# Patient Record
Sex: FEMALE | Race: WHITE | NOT HISPANIC OR LATINO
[De-identification: names, ages, dates, MRNs, and addresses within clinical notes are randomized per-mention and may not be internally consistent; named-entity substitution may affect disease eponyms.]

---

## 2017-05-11 ENCOUNTER — HOSPITAL (OUTPATIENT)
Dept: OTHER | Age: 27
End: 2017-05-11

## 2017-08-05 ENCOUNTER — HOSPITAL (OUTPATIENT)
Dept: OTHER | Age: 27
End: 2017-08-05
Attending: PODIATRIST

## 2018-03-28 ENCOUNTER — HOSPITAL (OUTPATIENT)
Dept: OTHER | Age: 28
End: 2018-03-28
Attending: EMERGENCY MEDICINE

## 2019-01-25 ENCOUNTER — OFFICE VISIT (OUTPATIENT)
Dept: NEUROSURGERY | Age: 29
End: 2019-01-25

## 2019-01-25 VITALS
DIASTOLIC BLOOD PRESSURE: 77 MMHG | BODY MASS INDEX: 20.31 KG/M2 | WEIGHT: 137.13 LBS | SYSTOLIC BLOOD PRESSURE: 121 MMHG | HEIGHT: 69 IN | HEART RATE: 88 BPM

## 2019-01-25 DIAGNOSIS — M54.2 NECK PAIN: Primary | ICD-10-CM

## 2019-01-25 DIAGNOSIS — M50.30 DISC DISEASE, DEGENERATIVE, CERVICAL: ICD-10-CM

## 2019-01-25 PROCEDURE — 99203 OFFICE O/P NEW LOW 30 MIN: CPT | Performed by: NEUROLOGICAL SURGERY

## 2019-01-25 RX ORDER — DULOXETIN HYDROCHLORIDE 30 MG/1
60 CAPSULE, DELAYED RELEASE ORAL
COMMUNITY
Start: 2018-07-19

## 2019-01-25 RX ORDER — IBUPROFEN 200 MG
200 TABLET ORAL
COMMUNITY

## 2019-01-25 RX ORDER — ALPRAZOLAM 0.5 MG/1
0.5 TABLET ORAL
COMMUNITY
Start: 2018-06-08

## 2019-01-25 ASSESSMENT — ENCOUNTER SYMPTOMS
WEAKNESS: 0
SPEECH DIFFICULTY: 0
HEADACHES: 0
HEMATOLOGIC/LYMPHATIC NEGATIVE: 1
ACTIVITY CHANGE: 0
NUMBNESS: 1
ENDOCRINE NEGATIVE: 1
EYES NEGATIVE: 1
BACK PAIN: 1
FATIGUE: 0
RESPIRATORY NEGATIVE: 1
LIGHT-HEADEDNESS: 0
APPETITE CHANGE: 0
SEIZURES: 0
UNEXPECTED WEIGHT CHANGE: 0
DIZZINESS: 0
TREMORS: 0
GASTROINTESTINAL NEGATIVE: 1

## 2020-03-10 ENCOUNTER — HOSPITAL (OUTPATIENT)
Dept: OTHER | Age: 30
End: 2020-03-10
Attending: PHYSICIAN ASSISTANT

## 2020-03-10 LAB
CONTROL LINE: PRESENT
Lab: CLEAR
URINE PREG POC: NEGATIVE

## 2022-11-07 ENCOUNTER — APPOINTMENT (OUTPATIENT)
Dept: OTHER | Facility: HOSPITAL | Age: 32
End: 2022-11-07
Attending: PREVENTIVE MEDICINE

## 2023-02-06 PROBLEM — Q75.2 HYPERTELORISM: Status: ACTIVE | Noted: 2023-02-06

## 2023-02-06 PROBLEM — K57.92 DIVERTICULITIS: Status: ACTIVE | Noted: 2023-02-06

## 2023-02-06 PROBLEM — Q67.6 PECTUS EXCAVATUM: Status: ACTIVE | Noted: 2023-02-06

## 2023-02-06 PROBLEM — N13.70 VESICO-URETERAL REFLUX: Status: ACTIVE | Noted: 2023-02-06

## 2025-03-04 ENCOUNTER — TELEPHONE (OUTPATIENT)
Facility: CLINIC | Age: 35
End: 2025-03-04

## 2025-03-04 NOTE — TELEPHONE ENCOUNTER
Patient scheduled for upper back pain.    Future Appointments   Date Time Provider Department Center   3/10/2025  8:30 AM Elvin Hedrick, PA [facility] ORTH [facility]     No recent imaging.    Please advise.

## 2025-03-10 ENCOUNTER — HOSPITAL ENCOUNTER (OUTPATIENT)
Dept: GENERAL RADIOLOGY | Facility: HOSPITAL | Age: 35
Discharge: HOME | End: 2025-03-10
Attending: PHYSICIAN ASSISTANT
Payer: COMMERCIAL

## 2025-03-10 ENCOUNTER — OFFICE VISIT (OUTPATIENT)
Age: 35
End: 2025-03-10
Payer: COMMERCIAL

## 2025-03-10 DIAGNOSIS — M48.062 SPINAL STENOSIS, LUMBAR REGION WITH NEUROGENIC CLAUDICATION: ICD-10-CM

## 2025-03-10 DIAGNOSIS — M54.2 NECK PAIN: ICD-10-CM

## 2025-03-10 DIAGNOSIS — M47.812 CERVICAL SPONDYLOSIS: ICD-10-CM

## 2025-03-10 DIAGNOSIS — M54.12 CERVICAL RADICULOPATHY: ICD-10-CM

## 2025-03-10 DIAGNOSIS — M54.2 NECK PAIN: Primary | ICD-10-CM

## 2025-03-10 DIAGNOSIS — R20.0 RIGHT ARM NUMBNESS: ICD-10-CM

## 2025-03-10 PROCEDURE — 99203 OFFICE O/P NEW LOW 30 MIN: CPT | Performed by: PHYSICIAN ASSISTANT

## 2025-03-10 PROCEDURE — 72050 X-RAY EXAM NECK SPINE 4/5VWS: CPT | Performed by: PHYSICIAN ASSISTANT

## 2025-03-10 RX ORDER — PREDNISONE 20 MG/1
20 TABLET ORAL DAILY
Qty: 5 TABLET | Refills: 0 | Status: SHIPPED | OUTPATIENT
Start: 2025-03-10 | End: 2025-03-15

## 2025-03-10 NOTE — PROGRESS NOTES
Patient: Catherine Parsons  Medical Record Number: [id number]  Site: [facility]  Referring Physician:  No ref. provider found  PCP: No primary care provider on file.       HISTORY OF CHIEF COMPLAINT:    Catherine Parsons is a 34 year old female, who complains of 6-year h/o right-sided radiating neck pain..  Denies dropping objects or difficulty walking.  Patient has neck pain that shoots into her shoulder blade and trapezius and goes down the front of her arm to her elbow.  She occasionally has numbness in the same distribution.  She does feel weaker with the right side.  She has been going to physical therapy with [facility] off-and-on over the years without much relief.  She just went back to physical therapy at [facility] and was told to follow-up with me.  She is frustrated due to the severity of her pain.  She is limited.  She has difficulty lifting her right arm up.  She had trigger point injections in the past without lasting relief.  She has not had an epidural steroid injection.    VAS Pain Score: 6 /10        Past Medical History:    Diverticulitis    Hypertelorism    Pectus excavatum    Vesico-ureteral reflux      Past Surgical History:   Procedure Laterality Date    Other surgical history        Family History   Problem Relation Age of Onset    Anxiety Mother     Autoimmune disease Mother     Anxiety Maternal Grandmother       Social History     Socioeconomic History    Marital status: Single   Tobacco Use    Smoking status: Never    Smokeless tobacco: Never   Vaping Use    Vaping status: Never Used   Substance and Sexual Activity    Alcohol use: Never     Comment: h/o 1-2 wine coolers a week    Drug use: Never      Current Medications:  Current Outpatient Medications   Medication Sig Dispense Refill    predniSONE 20 MG Oral Tab Take 1 tablet (20 mg total) by mouth daily for 5 days. 5 tablet 0    QUETIAPINE 25 MG Oral Tab TAKE 1 TABLET BY MOUTH EVERY DAY AT NIGHT 30 tablet 0    clonazePAM 0.25 MG  Oral Tablet Dispersible Take 1 tablet (0.25 mg total) by mouth nightly as needed. 30 tablet 0    sertraline 100 MG Oral Tab Take 1 tablet (100 mg total) by mouth daily. 30 tablet 0    buPROPion ER (WELLBUTRIN XL) 150 MG Oral Tablet 24 Hr Take 1 tablet (150 mg total) by mouth every morning. 30 tablet 0    clonazePAM 0.5 MG Oral Tab Take 1 tablet (0.5 mg total) by mouth 2 (two) times daily as needed for Anxiety. 60 tablet 0    famotidine 40 MG Oral Tab Take 1 tablet (40 mg total) by mouth 2 (two) times daily.  0    hydrOXYzine 25 MG Oral Tab Take 12.5 mg by mouth 3 (three) times daily as needed for Anxiety.        .        IMAGING STUDIES:  X-rays were reviewed with the patient today  X-rays  Images were reviewed and discussed with the patient.  They voiced understanding of what the images showed.  EXAM:  X-RAY OF CERVICAL SPINE     CLINICAL INFORMATION:  Neck pain    FINDINGS:    AP and Lateral views of cervical spine completed.   7 cervical vertebrae visualized.   Disc heights well maintained.    Spinous processes well aligned.  No obvious fractures or destructive lesions seen.    IMPRESSION:    Unremarkable AP/Lateral views of cervical spine.          PHYSICAL EXAMIMATION   PHYSICAL EXAMINATION: Catherine Parsons is a 34 year old   female who is observed sitting comfortably in the exam room alert and oriented times three. RESPIRATORY RATE: 18 She looks consistent her stated age.    There were no vitals taken for this visit.  The patient is well developed, well nourished, thin body habitus, well muscled.       Inspection: No acute distress.   Patient displays non-antalgic gait, and is able to normal heel walk, normal toe walk.     Coordination: Well coordinated, Fluid gait    Cervical spine:  Patient is A&O X 3 and in no apparent distress.   Neck is soft and supple with no masses or lymphadenopathy palpated.  mildly tender to palpation over the para spinals and C7 spinous process  Skin intact with no  erythema, edema or ecchymosis noted  pain with left and right rotation of neck  Spulrlings Maneuver is + to the right  Bowen's Maneuver is negative bilaterally  Smooth pain free ROM to bilateral wrist, elbow; no shoulder pain. She does have stiffness to the right shoulder  Sensation is intact to all dermatome patterns of bilateral upper extremities  Tinel's is negative to bilateral cubital and carpal tunnels. negative Phalen's    Strength testing:   Left   Right   5/5 Deltoid  5/5 Deltoid    5/5 Biceps  5/5 Biceps   5/5 Triceps  5/5 Triceps    5/5 Ext Rot  5/5 Ext Rot   5/5 Wrist Ext  5/5 Wrist Ext   5/5 Intrinsics  5/5 Intrinsics    DTR  Left   Right   2+Biceps  2+ Biceps   2+Triceps  2+Triceps   2+Brichoradialis  2+Brachioradialis    Bilateral Lower Extremitis are negative for clonus                                                                                       MEDICAL DECISION MAKING:   Impression: Mild cervical spondylosis  Cervical radiculopathy  Right shoulder stiffness2    Plan: We discussed the diagnosis and treatment options today, including rationale for surgical vs non-surgical options.  The patient is having what sounds like radicular symptoms from her cervical spine.  She has tried physical therapy off-and-on over the last several years without lasting relief.  She has had trigger point injections without relief.  At this time I would like to order an MRI to look for nerve compression.  Hopefully she will be a candidate for an epidural steroid injection.  She has mild degeneration at C6-7.  I did give her an order for another round of physical therapy.  I also gave her prednisone 20 mg once a day for 5 days.  She has had prednisone in the past without any problems.  Side effects were reviewed and discussed.  She will hold off on her NSAIDs at this time.  Her concerns were addressed.  Restrictions and limitations were reviewed with the patient.  Concerns were addressed.  Questions were  encouraged and answered.  Patient voiced understanding.  Images were reviewed and discussed with the patient.  They voiced understanding of what the images showed.      The patient indicates understanding of these issues and agrees to the plan.    Thank you very much.     Respectfully yours,    Elvin Hedrick PA-C    This note was dictated using Dragon software. While it was briefly proofread prior to completion, some grammatical, spelling, and word choice errors due to dictation may still occur.

## 2025-03-11 ENCOUNTER — TELEPHONE (OUTPATIENT)
Dept: ORTHOPEDICS CLINIC | Facility: CLINIC | Age: 35
End: 2025-03-11

## 2025-03-11 DIAGNOSIS — M25.511 RIGHT SHOULDER PAIN, UNSPECIFIED CHRONICITY: Primary | ICD-10-CM

## 2025-03-11 NOTE — TELEPHONE ENCOUNTER
XR ordered per ortho protocol. XR scheduled and patient was notified via [facility] to let them know that they should arrive 15-20 minutes early, in order for them to complete imaging.

## 2025-03-12 ENCOUNTER — PATIENT MESSAGE (OUTPATIENT)
Age: 35
End: 2025-03-12

## 2025-03-17 ENCOUNTER — OFFICE VISIT (OUTPATIENT)
Dept: ORTHOPEDICS CLINIC | Facility: CLINIC | Age: 35
End: 2025-03-17
Payer: COMMERCIAL

## 2025-03-17 ENCOUNTER — HOSPITAL ENCOUNTER (OUTPATIENT)
Dept: GENERAL RADIOLOGY | Age: 35
Discharge: HOME | End: 2025-03-17
Attending: ORTHOPAEDIC SURGERY
Payer: COMMERCIAL

## 2025-03-17 VITALS — HEIGHT: 68 IN | BODY MASS INDEX: 24.86 KG/M2 | WEIGHT: 164 LBS

## 2025-03-17 DIAGNOSIS — G56.21 CUBITAL TUNNEL SYNDROME ON RIGHT: ICD-10-CM

## 2025-03-17 DIAGNOSIS — M25.511 RIGHT SHOULDER PAIN, UNSPECIFIED CHRONICITY: ICD-10-CM

## 2025-03-17 DIAGNOSIS — M25.811 IMPINGEMENT OF RIGHT SHOULDER: Primary | ICD-10-CM

## 2025-03-17 PROCEDURE — 73030 X-RAY EXAM OF SHOULDER: CPT | Performed by: ORTHOPAEDIC SURGERY

## 2025-03-17 PROCEDURE — 99204 OFFICE O/P NEW MOD 45 MIN: CPT | Performed by: ORTHOPAEDIC SURGERY

## 2025-03-17 NOTE — PROGRESS NOTES
[facility] Orthopaedic Clinic Note    Chief Complaint   Patient presents with    Shoulder Pain     RIGHT SHOULDER  -Pain for 2 1/2 weeks  -Pain radiates down to the elbow  -Has done PT in the past for the shoulder with some improvement     HPI: The patient is a 34 year old female with history of pectus excavatum requiring multiple pediatric musculoskeletal surgeries referred for orthopaedic consultation with complaints of new onset right shoulder pain.  Symptoms began a couple of weeks ago without injury.  Symptoms are localized to the anterior lateral aspect of her right shoulder extending down the deltoid.  She also has some associated neck tightness and radiating tingling from the elbow to the small and ring fingers.  Overhead activities, reaching and lifting activities have been poorly tolerated with intermittent clicking and popping about the shoulder.  She does not report any exacerbating hobbies or work related activities.  She has had physical therapy in the past and wonders if a dedicated PT prescription for her shoulders would be acceptable.    Past Medical History:    Diverticulitis    Hypertelorism    Pectus excavatum    Vesico-ureteral reflux     Past Surgical History:   Procedure Laterality Date    Other surgical history       Current Outpatient Medications   Medication Sig Dispense Refill    QUETIAPINE 25 MG Oral Tab TAKE 1 TABLET BY MOUTH EVERY DAY AT NIGHT 30 tablet 0    clonazePAM 0.25 MG Oral Tablet Dispersible Take 1 tablet (0.25 mg total) by mouth nightly as needed. 30 tablet 0    sertraline 100 MG Oral Tab Take 1 tablet (100 mg total) by mouth daily. 30 tablet 0    buPROPion ER (WELLBUTRIN XL) 150 MG Oral Tablet 24 Hr Take 1 tablet (150 mg total) by mouth every morning. 30 tablet 0    clonazePAM 0.5 MG Oral Tab Take 1 tablet (0.5 mg total) by mouth 2 (two) times daily as needed for Anxiety. 60 tablet 0    famotidine 40 MG Oral Tab Take 1 tablet (40 mg total) by mouth 2 (two) times daily.   0     Allergies[1]  Family History   Problem Relation Age of Onset    Anxiety Mother     Autoimmune disease Mother     Anxiety Maternal Grandmother      Social History     Occupational History    Not on file   Tobacco Use    Smoking status: Never    Smokeless tobacco: Never   Vaping Use    Vaping status: Never Used   Substance and Sexual Activity    Alcohol use: Never     Comment: h/o 1-2 wine coolers a week    Drug use: Never    Sexual activity: Not on file        ROS:  Complete ROS reviewed by me and non-contributory to the chief complaint except as mentioned above.    Physical Exam:    Ht 5' 8\" (1.727 m)   Wt 164 lb (74.4 kg)   BMI 24.94 kg/m²   Constitutional: Well developed, well nourished 34 year old female presenting with her fiancé  Psychological: NAD, alert and appropriate  Respiratory: Breathing comfortably on room air with RR of 10-14  Cardiac: Palpable distal pulses with pink warm extremities distally  Examination of the C-spine reveals adequate active range of motion with minimal complaint.  Surgical scars are noted to the posterior lateral aspect of the neck bilaterally at the trapezial regions.  Shoulder girdles reveal no visible swelling, discoloration but posture is very hooded with narrow base due to her pectus excavatum.  Active range of motion generates pain through the impingement zone on forward elevation to approximately 160 degrees.  Similar findings are noted on abduction through the impingement zone to 40.  External rotation is symmetrical at about 65 degrees bilaterally with internal rotation above the belt line.  Ojeda is painful anterior laterally with less pain on Speed's test and Burnett's test.  Biceps groove is minimally tender.  AC joint is mildly prominent but minimally tender with adequate tolerance of crossarm adduction.  Rotator cuff strength is symmetrical and intact in all planes bilaterally.  No instability on sulcus or load-and-shift.  Hand, wrist and elbow range of  motion is within acceptable limits.  Neurovascular status is intact on sensory, motor and perfusion assessment distally.    Imaging: Multiple views right shoulder personally viewed, demonstrating no acute fracture dislocation or late glenohumeral degenerative changes.  Formal radiology reading is pending.    XR CERVICAL SPINE (4VIEWS) (CPT=72050)    Result Date: 3/14/2025  CONCLUSION:   1. No acute fracture or traumatic listhesis of the cervical spine. 2. No evidence of dynamic instability. 3. Preserved disc heights with few trace ventral disc osteophyte complexes and minimal scattered uncovertebral hypertrophy.     Dictated by (CST): Stanley Pena MD on 3/14/2025 at 8:19 AM     Finalized by (CST): Stanley Pena MD on 3/14/2025 at 8:23 AM           Assessment/Diagnoses:  Diagnoses and all orders for this visit:    Impingement of right shoulder  -     Physical Therapy Referral - External    Cubital tunnel syndrome on right      Plan:  I reviewed imaging and exam findings with the patient.  Symptoms are consistent with rotator cuff tendinitis and impingement.  Referencing the patient's imaging studies, I explained the diagnosis, etiology and natural history of rotator cuff tendinitis and impingement.  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  Her posture is certainly footed at the shoulders anteriorly which may increase the likelihood of impingement.  We discussed initial treatment options including conservative care through activity modification, anti-inflammatory use and physical therapy.  Given the brief duration of symptoms and mild impact on the patient's activities of daily living, we discussed the option for initial conservative care with a formal course of physical therapy.  This is prescribed for the next 4 weeks with instructions to gradually transition to home exercise program.  All questions were answered and the  patient verbalized understanding and appreciation.  Follow-up if symptoms do not respond.      Nerissa Gibbons MD, FAAOS  Orthopaedic Surgery   Sports Medicine/Knee and Shoulder  [facility]  t: [phone]  f: [phone]             This document was partially prepared using Dragon Medical voice recognition software.  Although every attempt is made to correct errors during dictation, discrepancies may still exist.            [1]   Allergies  Allergen Reactions    Penicillins RASH

## 2025-03-20 ENCOUNTER — PATIENT MESSAGE (OUTPATIENT)
Dept: ORTHOPEDICS CLINIC | Facility: CLINIC | Age: 35
End: 2025-03-20

## 2025-04-10 ENCOUNTER — PATIENT MESSAGE (OUTPATIENT)
Dept: ORTHOPEDICS CLINIC | Facility: CLINIC | Age: 35
End: 2025-04-10

## 2025-04-28 ENCOUNTER — OFFICE VISIT (OUTPATIENT)
Dept: ORTHOPEDICS CLINIC | Facility: CLINIC | Age: 35
End: 2025-04-28
Payer: COMMERCIAL

## 2025-04-28 VITALS — BODY MASS INDEX: 24.86 KG/M2 | WEIGHT: 164 LBS | HEIGHT: 68 IN

## 2025-04-28 DIAGNOSIS — G89.29 CHRONIC RIGHT SHOULDER PAIN: ICD-10-CM

## 2025-04-28 DIAGNOSIS — M25.511 CHRONIC RIGHT SHOULDER PAIN: ICD-10-CM

## 2025-04-28 DIAGNOSIS — M25.811 IMPINGEMENT OF RIGHT SHOULDER: Primary | ICD-10-CM

## 2025-04-28 RX ORDER — TRIAMCINOLONE ACETONIDE 40 MG/ML
40 INJECTION, SUSPENSION INTRA-ARTICULAR; INTRAMUSCULAR ONCE
Status: COMPLETED | OUTPATIENT
Start: 2025-04-28 | End: 2025-04-28

## 2025-04-28 RX ADMIN — TRIAMCINOLONE ACETONIDE 40 MG: 40 INJECTION, SUSPENSION INTRA-ARTICULAR; INTRAMUSCULAR at 10:02:00

## 2025-04-28 NOTE — PROGRESS NOTES
[facility] Orthopaedic Clinic Note    Chief Complaint   Patient presents with    Follow - Up     RIGHT SHOULDER  -Is interested in a cortisone injection     HPI: The patient is a 34 year old female with history of pectus excavatum requiring multiple pediatric musculoskeletal surgeries referred for orthopaedic consultation with complaints of new onset right shoulder pain.  Symptoms began in early March without injury.  Symptoms are localized to the anterior lateral aspect of her right shoulder extending down the deltoid.  She also has some associated neck tightness and radiating tingling from the elbow to the small and ring fingers.  Overhead activities, reaching and lifting activities have been poorly tolerated with intermittent clicking and popping about the shoulder.  She does not report any exacerbating hobbies or work related activities.  She has had physical therapy still struggling.  She presents today to discuss options for possible corticosteroid injection.    Past Medical History:    Diverticulitis    Hypertelorism    Pectus excavatum    Vesico-ureteral reflux     Past Surgical History:   Procedure Laterality Date    Other surgical history       Current Outpatient Medications   Medication Sig Dispense Refill    sertraline 100 MG Oral Tab Take 1 tablet (100 mg total) by mouth daily. 30 tablet 0    clonazePAM 0.25 MG Oral Tablet Dispersible Take 1 tablet (0.25 mg total) by mouth nightly as needed. 30 tablet 0    buPROPion ER (WELLBUTRIN XL) 150 MG Oral Tablet 24 Hr Take 1 tablet (150 mg total) by mouth every morning. 30 tablet 0    QUEtiapine 25 MG Oral Tab Take 1 tablet (25 mg total) by mouth nightly. 30 tablet 0    clonazePAM 0.5 MG Oral Tab Take 1 tablet (0.5 mg total) by mouth 2 (two) times daily as needed for Anxiety. 60 tablet 0    famotidine 40 MG Oral Tab Take 1 tablet (40 mg total) by mouth 2 (two) times daily.  0     Allergies[1]  Family History   Problem Relation Age of Onset    Anxiety Mother      Autoimmune disease Mother     Anxiety Maternal Grandmother      Social History     Occupational History    Not on file   Tobacco Use    Smoking status: Never    Smokeless tobacco: Never   Vaping Use    Vaping status: Never Used   Substance and Sexual Activity    Alcohol use: Never     Comment: h/o 1-2 wine coolers a week    Drug use: Never    Sexual activity: Not on file        ROS:  Complete ROS reviewed by me and non-contributory to the chief complaint except as mentioned above.    Physical Exam:    Ht 5' 8\" (1.727 m)   Wt 164 lb (74.4 kg)   BMI 24.94 kg/m²   Constitutional: Well developed, well nourished 34 year old female presenting with her fiancé  Psychological: NAD, alert and appropriate  Respiratory: Breathing comfortably on room air with RR of 10-14  Cardiac: Palpable distal pulses with pink warm extremities distally  Examination of the C-spine reveals adequate active range of motion with minimal complaint.  Surgical scars are noted to the posterior lateral aspect of the neck bilaterally at the trapezial regions.  Shoulder girdles reveal no visible swelling, discoloration but posture is very hooded with narrow base due to her pectus excavatum.  Active range of motion generates pain through the impingement zone on forward elevation to approximately 160 degrees.  Similar findings are noted on abduction through the impingement zone to 40.  External rotation is symmetrical at about 65 degrees bilaterally with internal rotation above the belt line.  Ojeda is painful anterior laterally with less pain on Speed's test and Cheatham's test.  Biceps groove is minimally tender.  AC joint is mildly prominent but minimally tender with adequate tolerance of crossarm adduction.  Rotator cuff strength is symmetrical and intact in all planes bilaterally.  No instability on sulcus or load-and-shift.  Hand, wrist and elbow range of motion is within acceptable limits.  Neurovascular status is intact on sensory, motor and  perfusion assessment distally.    Imaging: Multiple views right shoulder personally viewed, demonstrating no acute fracture dislocation or late glenohumeral degenerative changes.  Formal radiology reading is pending.    XR SHOULDER, COMPLETE (MIN 2 VIEWS), RIGHT (CPT=73030)  Result Date: 3/19/2025  CONCLUSION: Minimal degenerative changes within the right shoulder.    Dictated by (CST): Kapil Galindo MD on 3/19/2025 at 1:26 PM     Finalized by (CST): Kapil Galindo MD on 3/19/2025 at 1:26 PM          XR CERVICAL SPINE (4VIEWS) (CPT=72050)  Result Date: 3/14/2025  CONCLUSION:   1. No acute fracture or traumatic listhesis of the cervical spine. 2. No evidence of dynamic instability. 3. Preserved disc heights with few trace ventral disc osteophyte complexes and minimal scattered uncovertebral hypertrophy.     Dictated by (CST): Stanley Pena MD on 3/14/2025 at 8:19 AM     Finalized by (CST): Stanley Pena MD on 3/14/2025 at 8:23 AM            Assessment/Diagnoses:  Diagnoses and all orders for this visit:    Impingement of right shoulder  -     DRAIN/INJECT LARGE JOINT/BURSA  -     triamcinolone acetonide (Kenalog-40) 40 MG/ML injection 40 mg    Chronic right shoulder pain  -     DRAIN/INJECT LARGE JOINT/BURSA  -     triamcinolone acetonide (Kenalog-40) 40 MG/ML injection 40 mg      Plan:  I reviewed imaging and exam findings with the patient.  She continues to struggle with symptoms consistent with rotator cuff tendinitis and impingement.  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  Her posture is certainly hooded at the shoulders anteriorly, which may increase the likelihood of impingement.  We discussed initial treatment options including conservative care through activity modification, anti-inflammatory use and physical therapy.  Given the persistent nature of her symptoms despite initial conservative care, we also discussed  the option for corticosteroid injection.  After reviewing the nature and risks she elects to proceed.  All questions were answered and the patient verbalized understanding and appreciation.  Follow-up if symptoms do not respond.  Advanced imaging with an MRI may be reasonable next step.    Shoulder Subacromial Injection Procedure:  After discussing risks, benefits and alternatives to subacromial injection including but not limited to needle infection, steroid flare, temporary elevations in blood sugar in diabetics or failed improvement, the patient gave written and verbal consent to proceed.  Using meticulous sterile technique, I injected 40 mg of Kenalog mixed with 2 cc of 1% Xylocaine and 2 cc of 0.25% Marcaine at the posterior portal site of the right shoulder to minimal resistance.  The patient tolerated this well and a Band-Aid was applied.  Instructions were given to contact us if the patient experiences any adverse effects.        Nerissa Gibbons MD, [facility]  Orthopaedic Surgery   Sports Medicine/Knee and Shoulder  [facility]  t: [phone]  f: [phone]             This document was partially prepared using Dragon Medical voice recognition software.  Although every attempt is made to correct errors during dictation, discrepancies may still exist.            [1]   Allergies  Allergen Reactions    Penicillins RASH

## 2025-06-10 ENCOUNTER — MED REC SCAN ONLY (OUTPATIENT)
Dept: ORTHOPEDICS CLINIC | Facility: CLINIC | Age: 35
End: 2025-06-10

## 2025-07-15 ENCOUNTER — MED REC SCAN ONLY (OUTPATIENT)
Dept: ORTHOPEDICS CLINIC | Facility: CLINIC | Age: 35
End: 2025-07-15